# Patient Record
Sex: FEMALE | Race: WHITE | NOT HISPANIC OR LATINO | Employment: FULL TIME | URBAN - METROPOLITAN AREA
[De-identification: names, ages, dates, MRNs, and addresses within clinical notes are randomized per-mention and may not be internally consistent; named-entity substitution may affect disease eponyms.]

---

## 2023-05-31 ENCOUNTER — PATIENT MESSAGE (OUTPATIENT)
Dept: RESEARCH | Facility: HOSPITAL | Age: 63
End: 2023-05-31
Payer: COMMERCIAL

## 2023-09-20 ENCOUNTER — TELEPHONE (OUTPATIENT)
Dept: PRIMARY CARE CLINIC | Facility: CLINIC | Age: 63
End: 2023-09-20
Payer: COMMERCIAL

## 2023-09-20 NOTE — TELEPHONE ENCOUNTER
----- Message from Hilary Vee sent at 9/20/2023 10:14 AM CDT -----  Regarding: pt call back  Name of Who is Calling:Pt          What is the request in detail: Pt inquiring next available for provider for new pt appt please contact at earliest convenience            Can the clinic reply by [facility]: no         [facility]: Telephone Information:  Mobile          [phone]

## 2023-09-20 NOTE — TELEPHONE ENCOUNTER
----- Message from Priyanka Sanders sent at 9/19/2023  8:42 AM CDT -----  Regarding: Pt called to schedule a new pt appt and would like a call back today  Appointment Access Request:    Pt called to schedule a new pt appt and would like a call back today    Pt can be reached at [phone]     Initiate Treatment: Clindamycin 1% topical gel QAM. Adapalene 0.3% topical gel QHS. Detail Level: Zone Plan: Recommended CeraVe cleansers and moisturizers Render In Strict Bullet Format?: No